# Patient Record
Sex: FEMALE
[De-identification: names, ages, dates, MRNs, and addresses within clinical notes are randomized per-mention and may not be internally consistent; named-entity substitution may affect disease eponyms.]

---

## 2017-05-30 ENCOUNTER — HOSPITAL ENCOUNTER (OUTPATIENT)
Dept: HOSPITAL 31 - C.OPSURG | Age: 52
Discharge: HOME | End: 2017-05-30
Attending: OBSTETRICS & GYNECOLOGY
Payer: COMMERCIAL

## 2017-05-30 VITALS — BODY MASS INDEX: 36.6 KG/M2

## 2017-05-30 VITALS
RESPIRATION RATE: 20 BRPM | SYSTOLIC BLOOD PRESSURE: 134 MMHG | HEART RATE: 74 BPM | TEMPERATURE: 97.8 F | OXYGEN SATURATION: 98 % | DIASTOLIC BLOOD PRESSURE: 74 MMHG

## 2017-05-30 DIAGNOSIS — N84.0: ICD-10-CM

## 2017-05-30 DIAGNOSIS — R10.2: ICD-10-CM

## 2017-05-30 DIAGNOSIS — D25.0: Primary | ICD-10-CM

## 2017-05-30 LAB
BASOPHILS # BLD AUTO: 0.1 K/UL (ref 0–0.2)
BASOPHILS NFR BLD: 1.1 % (ref 0–2)
BUN SERPL-MCNC: 15 MG/DL (ref 7–17)
CALCIUM SERPL-MCNC: 8.9 MG/DL (ref 8.6–10.4)
CHLORIDE SERPL-SCNC: 100 MMOL/L (ref 98–107)
CO2 SERPL-SCNC: 29 MMOL/L (ref 22–30)
EOSINOPHIL # BLD AUTO: 0.4 K/UL (ref 0–0.7)
EOSINOPHIL NFR BLD: 6.2 % (ref 0–4)
ERYTHROCYTE [DISTWIDTH] IN BLOOD BY AUTOMATED COUNT: 13.4 % (ref 11.5–14.5)
GLUCOSE SERPL-MCNC: 95 MG/DL (ref 65–105)
HCT VFR BLD CALC: 38.8 % (ref 34–47)
LYMPHOCYTES # BLD AUTO: 1.9 K/UL (ref 1–4.3)
LYMPHOCYTES NFR BLD AUTO: 27.4 % (ref 20–40)
MCH RBC QN AUTO: 27.7 PG (ref 27–31)
MCHC RBC AUTO-ENTMCNC: 33.9 G/DL (ref 33–37)
MCV RBC AUTO: 81.7 FL (ref 81–99)
MONOCYTES # BLD: 0.5 K/UL (ref 0–0.8)
MONOCYTES NFR BLD: 6.6 % (ref 0–10)
NRBC BLD AUTO-RTO: 0 % (ref 0–2)
PLATELET # BLD: 274 K/UL (ref 130–400)
PMV BLD AUTO: 8.5 FL (ref 7.2–11.7)
POTASSIUM SERPL-SCNC: 4.1 MMOL/L (ref 3.6–5.2)
SODIUM SERPL-SCNC: 139 MMOL/L (ref 132–148)
WBC # BLD AUTO: 6.9 K/UL (ref 4.8–10.8)

## 2017-05-30 PROCEDURE — 36415 COLL VENOUS BLD VENIPUNCTURE: CPT

## 2017-05-30 PROCEDURE — 88305 TISSUE EXAM BY PATHOLOGIST: CPT

## 2017-05-30 PROCEDURE — 86850 RBC ANTIBODY SCREEN: CPT

## 2017-05-30 PROCEDURE — 58561 HYSTEROSCOPY REMOVE MYOMA: CPT

## 2017-05-30 PROCEDURE — 86900 BLOOD TYPING SEROLOGIC ABO: CPT

## 2017-05-30 PROCEDURE — 80048 BASIC METABOLIC PNL TOTAL CA: CPT

## 2017-05-30 PROCEDURE — 85025 COMPLETE CBC W/AUTO DIFF WBC: CPT

## 2017-05-30 NOTE — [id number].[id number]
Surgeon's Initial Post Op Note





- Surgeon's Notes


Surgeon: Vida Clark MD


Assistant: none


Type of Anesthesia: General LMA


Pre-Operative Diagnosis: Submucosal myoma, pelvic pain


Operative Findings: 10 week size uteurs, no adnexal masses, stenotic cervix, 

uteurs sounded to 9cm, submucosal myoma noted anterior  protruding into cavity 

, bilateral ostia visualized, endometrial polypoid tissues noted along left 

anterior uterine wall in close proximity to cervical junction.


Post-Operative Diagnosis: same as above, endometrial polyp


Operation Performed: Hysteroscopic myomectomy, Fractional dilation and 

currettage, endometrial polypectomy


Specimen/Specimens Removed: endocervical currettings, endometrial currettings, 

submuoscal myoma/ endomterial polyp


Estimated Blood Loss: EBL {In ML}: 5


Blood Products Given: N/A


Drains Used: No Drains


Post-Op Condition: Good


Date of Surgery/Procedure: 05/30/17


Time of Surgery/Procedure: 14:00

## 2017-05-30 NOTE — OP
PROCEDURE DATE: 05/30/2017



SURGEON:  Dr. Vida Clark.



ASSISTANT:  None.



TYPE OF ANESTHESIA:  General LMA.



PREOPERATIVE DIAGNOSIS:  Submucosal myoma, pelvic pain. 



OPERATIVE FINDINGS:  Ten week size uterus.  No adnexal masses.  Stenotic cervix. Uterus sounded to 9 
cm.  Submucosal myoma noted, anterior, protruding into cavity, partially bilateral ostia visualized. 
 Endometrial polypoid tissue noted along the left anterior uterine wall in close proximity of the cer
vical junction.



POSTOPERATIVE DIAGNOSES:  Submucosa myoma, pelvic pain, endometrial polyp.



OPERATIONS PERFORMED:  Hysteroscopic myomectomy, fractional dilation and curettage, endometrial polyp
ectomy.



SPECIMEN REMOVED:  Endocervical curettings, endometrial curettings, submucosal myoma, endometrial haleigh
yp.



ESTIMATED BLOOD LOSS:  5 mL.



URINE OUTPUT:  30 mL clear yellow urine.



BLOOD PRODUCTS:  None.



COMPLICATIONS:  None.



DISPOSITION:  The patient was taken to the operating where she was given general anesthesia.  Once th
is was found to be adequate, she was positioned on the operating table in dorsal supine position with
 legs supported using stirrups.  The patient was prepped and draped in the usual normal sterile fashi
on.  Timeout to confirm correct patient and correct procedure.  Bimanual exam was performed with abov
e-mentioned findings.  Red rubber catheter was then inserted into the urethra to drain the bladder.  
Following this, a Cabrales retractor placed in the anterior, posterior fornix of the vagina.  The cervix 
was adequately visualized.  A single tooth tenaculum was placed on the anterior lip of the cervix.  E
ndocervical curettings were obtained with a Angelia curet and sent to pathology on [facility].  Followin
g this, the cervix was sequentially dilated to allow for the uterine sound.  Following this, the uter
us was sequentially dilated to allow for introduction of the hysteroscope under direct visualization 
with normal saline as the distending media.  Upon ____, bilateral ostia were visualized and there was
 submucosal myoma tissue noted to be protruding from the anterior portion slightly into the cavity.  
Closer to the cervical junction a polypoid tissue was noted along the left uterine wall.  The MyoSure
 device was then inserted under direct visualization and the myoma type tissue was resected in additi
on to the polypoid tissue.  There was good hemostasis noted.  Following this, the hysteroscope was th
en removed and a gentle curettage was done 360 degrees till a gritty texture was noted.  The single t
ooth tenaculum was removed from the anterior tenaculum site and there was good hemostasis noted.  All
 instruments removed.  At the end of the procedure, all needle, sponge and instrument counts were not
ed to be correct x 2.  The patient tolerated the procedure well and was transferred to recovery room 
in stable condition.





__________________________________________

Vida Clark MD







cc:



DD: 05/30/2017 14:13:19  [id number]

TT: 05/30/2017 15:31:23

Job # [id number]

mn

## 2017-06-04 NOTE — CARD
--------------- APPROVED REPORT --------------





EKG Measurement

Heart [id number]

VT [id number]

[id number]

[id number]

[id number]



<Conclusion>

Sinus bradycardia

Otherwise normal ECG

## 2019-04-09 ENCOUNTER — HOSPITAL ENCOUNTER (EMERGENCY)
Dept: HOSPITAL 42 - ED | Age: 54
Discharge: HOME | End: 2019-04-09
Payer: COMMERCIAL

## 2019-04-09 VITALS — BODY MASS INDEX: 36.6 KG/M2

## 2019-04-09 VITALS — TEMPERATURE: 98.2 F | RESPIRATION RATE: 16 BRPM

## 2019-04-09 DIAGNOSIS — M79.602: Primary | ICD-10-CM

## 2019-04-09 DIAGNOSIS — Z87.891: ICD-10-CM

## 2019-04-09 LAB
ALBUMIN SERPL-MCNC: 3.9 G/DL (ref 3–4.8)
ALBUMIN/GLOB SERPL: 1.4 {RATIO} (ref 1.1–1.8)
ALT SERPL-CCNC: 27 U/L (ref 7–56)
APTT BLD: 34.2 SECONDS (ref 26.9–38.3)
AST SERPL-CCNC: 31 U/L (ref 14–36)
BASOPHILS # BLD AUTO: 0.02 K/MM3 (ref 0–2)
BASOPHILS NFR BLD: 0.2 % (ref 0–3)
BUN SERPL-MCNC: 20 MG/DL (ref 7–21)
CALCIUM SERPL-MCNC: 8.7 MG/DL (ref 8.4–10.5)
EOSINOPHIL # BLD: 0.4 10*3/UL (ref 0–0.7)
EOSINOPHIL NFR BLD: 4.1 % (ref 1.5–5)
ERYTHROCYTE [DISTWIDTH] IN BLOOD BY AUTOMATED COUNT: 13 % (ref 11.5–14.5)
GFR NON-AFRICAN AMERICAN: > 60
HGB BLD-MCNC: 12.7 G/DL (ref 12–16)
INR PPP: 0.99
LYMPHOCYTES # BLD: 2.9 10*3/UL (ref 1.2–3.4)
LYMPHOCYTES NFR BLD AUTO: 32.4 % (ref 22–35)
MCH RBC QN AUTO: 27.7 PG (ref 25–35)
MCHC RBC AUTO-ENTMCNC: 32.6 G/DL (ref 31–37)
MCV RBC AUTO: 85.2 FL (ref 80–105)
MONOCYTES # BLD AUTO: 0.4 10*3/UL (ref 0.1–0.6)
MONOCYTES NFR BLD: 4.4 % (ref 1–6)
PLATELET # BLD: 255 10^3/UL (ref 120–450)
PMV BLD AUTO: 9.9 FL (ref 7–11)
PROTHROMBIN TIME: 11 SECONDS (ref 9.4–12.5)
RBC # BLD AUTO: 4.58 10^6/UL (ref 3.5–6.1)
TROPONIN I SERPL-MCNC: < 0.01 NG/ML
WBC # BLD AUTO: 8.8 10^3/UL (ref 4.5–11)

## 2019-04-09 PROCEDURE — 72050 X-RAY EXAM NECK SPINE 4/5VWS: CPT

## 2019-04-09 PROCEDURE — 96374 THER/PROPH/DIAG INJ IV PUSH: CPT

## 2019-04-09 PROCEDURE — 85730 THROMBOPLASTIN TIME PARTIAL: CPT

## 2019-04-09 PROCEDURE — 83615 LACTATE (LD) (LDH) ENZYME: CPT

## 2019-04-09 PROCEDURE — 85610 PROTHROMBIN TIME: CPT

## 2019-04-09 PROCEDURE — 85025 COMPLETE CBC W/AUTO DIFF WBC: CPT

## 2019-04-09 PROCEDURE — 80053 COMPREHEN METABOLIC PANEL: CPT

## 2019-04-09 PROCEDURE — 93971 EXTREMITY STUDY: CPT

## 2019-04-09 PROCEDURE — 71045 X-RAY EXAM CHEST 1 VIEW: CPT

## 2019-04-09 PROCEDURE — 84484 ASSAY OF TROPONIN QUANT: CPT

## 2019-04-09 PROCEDURE — 82550 ASSAY OF CK (CPK): CPT

## 2019-04-09 PROCEDURE — 93005 ELECTROCARDIOGRAM TRACING: CPT

## 2019-04-09 PROCEDURE — 83735 ASSAY OF MAGNESIUM: CPT

## 2019-04-09 PROCEDURE — 99284 EMERGENCY DEPT VISIT MOD MDM: CPT
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
## 2019-04-10 VITALS — SYSTOLIC BLOOD PRESSURE: 115 MMHG | HEART RATE: 68 BPM | OXYGEN SATURATION: 100 % | DIASTOLIC BLOOD PRESSURE: 76 MMHG

## 2019-04-10 NOTE — US
PROCEDURE:  Left upper extremity venous ultrasound



HISTORY:

Arm pain and swelling. Evaluate for deep venous thrombosis.



PHYSICIAN(S):  James Ac MD.



FINDINGS:

The visualized leftinternal jugular vein is sonographically normal 

and compressible. No evidence of obstruction or thrombus is seen. 



The visualized segments of the left subclavian vein are patent with 

normal waveforms. No sonographic evidence of obstruction or 

thrombosis is seen. 



The visualized deep venous system of the proximal leftupper extremity 

is sonographically normal and compressible.



IMPRESSION:

1. No sonographic evidence for deep venous thrombosis in the 

visualized segments of the left upper extremity.

## 2019-04-10 NOTE — RAD
Date of service: 



04/09/2019



PROCEDURE:  CHEST RADIOGRAPH, 1 VIEW



HISTORY:

left arm pain



COMPARISON:

None available.



FINDINGS:



LUNGS:

Clear.



PLEURA:

No pneumothorax or pleural fluid seen.



CARDIOVASCULAR:

 No aortic atherosclerotic calcification present. Normal.



OSSEOUS STRUCTURES:

No significant abnormalities.



VISUALIZED UPPER ABDOMEN:

Normal.



OTHER FINDINGS:

None. 



IMPRESSION:

No active disease.

## 2019-04-10 NOTE — RAD
Date of service: 



04/09/2019



PROCEDURE:  Cervical Spine Radiographs.



HISTORY:

Pain. No history of recent/ related trauma provided. 



COMPARISON:

None available.



TECHNIQUE:

3 views obtained.



FINDINGS:



BONES:

Alignment maintained. No fracture.  Dens Intact. 



DISC SPACES:

Cervical spondylotic changes C3-4, C4-5. 



SOFT TISSUES:

Normal. No prevertebral soft tissue swelling. 



OTHER FINDINGS:

None.



IMPRESSION:

No acute findings related to/ accounting  for the clinical 

presentation.

## 2019-04-10 NOTE — CARD
--------------- APPROVED REPORT --------------





Date of service: 04/09/2019



EKG Measurement

Heart [id number]

AL [id number]

[id number]

[id number]

[id number]



<Conclusion>

Sinus bradycardia with sinus arrhythmia

Otherwise normal ECG